# Patient Record
Sex: MALE | Race: BLACK OR AFRICAN AMERICAN | Employment: UNEMPLOYED | URBAN - METROPOLITAN AREA
[De-identification: names, ages, dates, MRNs, and addresses within clinical notes are randomized per-mention and may not be internally consistent; named-entity substitution may affect disease eponyms.]

---

## 2019-08-21 ENCOUNTER — HOSPITAL ENCOUNTER (EMERGENCY)
Age: 59
Discharge: HOME | End: 2019-08-21
Payer: MEDICARE

## 2019-08-21 VITALS
HEART RATE: 70 BPM | DIASTOLIC BLOOD PRESSURE: 71 MMHG | SYSTOLIC BLOOD PRESSURE: 111 MMHG | WEIGHT: 169.31 LBS | OXYGEN SATURATION: 96 % | TEMPERATURE: 99 F | RESPIRATION RATE: 14 BRPM | BODY MASS INDEX: 25 KG/M2

## 2019-08-21 DIAGNOSIS — L98.9 SKIN LESION: Primary | ICD-10-CM

## 2019-08-21 PROCEDURE — 99282 EMERGENCY DEPT VISIT SF MDM: CPT

## 2019-08-21 RX ORDER — BACITRACIN, NEOMYCIN, POLYMYXIN B 400; 3.5; 5 [USP'U]/G; MG/G; [USP'U]/G
OINTMENT TOPICAL 2 TIMES DAILY
Qty: 15 G | Refills: 0 | Status: SHIPPED | OUTPATIENT
Start: 2019-08-21

## 2019-08-21 ASSESSMENT — PAIN SCALES - GENERAL
PAINLEVEL_OUTOF10: 6
PAINLEVEL_OUTOF10: 6

## 2019-08-21 ASSESSMENT — PAIN DESCRIPTION - PAIN TYPE: TYPE: ACUTE PAIN

## 2019-08-21 ASSESSMENT — PAIN DESCRIPTION - DESCRIPTORS: DESCRIPTORS: OTHER (COMMENT)

## 2019-08-21 ASSESSMENT — PAIN - FUNCTIONAL ASSESSMENT: PAIN_FUNCTIONAL_ASSESSMENT: 0-10

## 2019-08-21 ASSESSMENT — PAIN DESCRIPTION - ORIENTATION: ORIENTATION: RIGHT

## 2019-08-21 ASSESSMENT — PAIN DESCRIPTION - LOCATION: LOCATION: ABDOMEN

## 2019-08-21 NOTE — ED PROVIDER NOTES
[address] [address] [address] [facility] [address]  Dept: [phone]  Loc: [address] ENCOUNTER        This patient was not seen or evaluated by the attending physician. I evaluated this patient, the attending physician was available for consultation. CHIEF COMPLAINT    Chief Complaint   Patient presents with    Other       HPI    Maggy Harris is a 59 y.o. male who presents with a skin lesion. The location is the abdomen. Patient states that he had a rash that he thought was psoriasis on his abdominal wall also he put steroid cream on it. He states that it then crusted over and the scab peeled off. He states there is now raw skin under the scab but he wants to make sure is not infected.     REVIEW OF SYSTEMS    Pulmonary: No difficulty breathing or chest tightness  Skin: see HPI  General: No fevers    PAST MEDICAL & SURGICAL HISTORY    Past Medical History:   Diagnosis Date    Back pain     Depression     History of alcohol dependence ([facility] [address]     Non-healing skin ulcers due to topical corticosteroid in therapeutic use (HCC)     Rash      Past Surgical History:   Procedure Laterality Date    COLONOSCOPY      HERNIA REPAIR         CURRENT MEDICATIONS  (may include discharge medications prescribed in the ED)      ALLERGIES    No Known Allergies    SOCIAL & FAMILY HISTORY    Social History     Socioeconomic History    Marital status: Single     Spouse name: None    Number of children: None    Years of education: None    Highest education level: None   Occupational History    None   Social Needs    Financial resource strain: None    Food insecurity:     Worry: None     Inability: None    Transportation needs:     Medical: None     Non-medical: None   Tobacco Use    Smoking status: Former Smoker     Packs/day: 0.50     Years: 15.00     Pack years: 7.50     Types: Cigarettes    Smokeless tobacco: Never Used  Tobacco comment: stopped smoking 01/2018   Substance and Sexual Activity    Alcohol use: No     Comment: drank 12 pack and 1/5 liquour daily for 15 years. SOBER x 8 years as of 11/25/13    Drug use: No    Sexual activity: Yes     Partners: Female   Lifestyle    Physical activity:     Days per week: None     Minutes per session: None    Stress: None   Relationships    Social connections:     Talks on phone: None     Gets together: None     Attends Yazdanism service: None     Active member of club or organization: None     Attends meetings of clubs or organizations: None     Relationship status: None    Intimate partner violence:     Fear of current or ex partner: None     Emotionally abused: None     Physically abused: None     Forced sexual activity: None   Other Topics Concern    None   Social History Narrative    None     Family History   Problem Relation Age of Onset    Diabetes Mother     Cancer Mother         breast    Diabetes Sister     Diabetes Brother        PHYSICAL EXAM    VITAL SIGNS: /71   Pulse 70   Temp 99 °F (37.2 °C) (Oral)   Resp 14   Wt 169 lb 5 oz (76.8 kg)   SpO2 96%   BMI 25.00 kg/m²   Constitutional:  Well developed, well nourished, no acute distress  HENT:  Atraumatic, no facial or lip swelling  ORAL EXAM:  No tongue swelling  Respiratory:  No respiratory distress  Musculoskeletal:  No edema, no acute deformities. Integument: + 1 x 1 cm area of desquamation on the right upper abdominal wall, no evidence of cellulitis, please see image below. There are no petechiae, pustules, purpura, induration, desquamation, bullae or discharge. ED COURSE & MEDICAL DECISION MAKING    Pertinent Labs reviewed and interpreted. (See chart for details)  See chart for details of medications given during the ED stay.     Differential diagnosis: Vasculitis, bacterial skin infection, viral rash, systemic infectious rash, Anaphylaxis, Urticaria, other    Patient is afebrile and

## 2019-08-26 ENCOUNTER — HOSPITAL ENCOUNTER (EMERGENCY)
Age: 59
Discharge: HOME | End: 2019-08-26
Attending: EMERGENCY MEDICINE
Payer: MEDICARE

## 2019-08-26 VITALS
BODY MASS INDEX: 23.89 KG/M2 | DIASTOLIC BLOOD PRESSURE: 68 MMHG | TEMPERATURE: 98.5 F | SYSTOLIC BLOOD PRESSURE: 109 MMHG | WEIGHT: 170.64 LBS | HEART RATE: 64 BPM | RESPIRATION RATE: 17 BRPM | OXYGEN SATURATION: 98 % | HEIGHT: 71 IN

## 2019-08-26 DIAGNOSIS — L30.9 DERMATITIS: ICD-10-CM

## 2019-08-26 DIAGNOSIS — T78.40XA ALLERGIC REACTION, INITIAL ENCOUNTER: Primary | ICD-10-CM

## 2019-08-26 PROCEDURE — 99282 EMERGENCY DEPT VISIT SF MDM: CPT

## 2019-08-26 PROCEDURE — 6370000000 HC RX 637 (ALT 250 FOR IP): Performed by: EMERGENCY MEDICINE

## 2019-08-26 RX ORDER — METHYLPREDNISOLONE 4 MG/1
TABLET ORAL
Qty: 1 KIT | Refills: 0 | Status: SHIPPED | OUTPATIENT
Start: 2019-08-26 | End: 2022-09-14

## 2019-08-26 RX ORDER — CLINDAMYCIN HYDROCHLORIDE 150 MG/1
150 CAPSULE ORAL 4 TIMES DAILY
Qty: 40 CAPSULE | Refills: 0 | Status: SHIPPED | OUTPATIENT
Start: 2019-08-26 | End: 2019-09-05

## 2019-08-26 RX ORDER — PREDNISONE 20 MG/1
60 TABLET ORAL ONCE
Status: COMPLETED | OUTPATIENT
Start: 2019-08-26 | End: 2019-08-26

## 2019-08-26 RX ORDER — HYDROCODONE BITARTRATE AND ACETAMINOPHEN 7.5; 3 MG/1; MG/1
7.5 TABLET ORAL 3 TIMES DAILY PRN
COMMUNITY

## 2019-08-26 RX ADMIN — PREDNISONE 60 MG: 20 TABLET ORAL at 13:32

## 2019-08-26 ASSESSMENT — PAIN DESCRIPTION - LOCATION: LOCATION: ABDOMEN

## 2019-08-26 ASSESSMENT — PAIN SCALES - GENERAL: PAINLEVEL_OUTOF10: 4

## 2019-08-26 ASSESSMENT — PAIN DESCRIPTION - DESCRIPTORS: DESCRIPTORS: BURNING

## 2019-08-26 ASSESSMENT — PAIN DESCRIPTION - PAIN TYPE: TYPE: ACUTE PAIN

## 2019-08-26 NOTE — CARE COORDINATION
SW met with patient and asked about a PCP. Patient informed that he had a PCP, Dominique Gamboa at [facility].

## 2019-08-29 NOTE — ED NOTES
[id number] already gently cleaned area with hibiclens and saline. Now reviewed home wound care. 8-10 4x4 gauzes placed to abd wound and taped in place. Explained rx's. Explained to pt that she could use sanitary napkins for absorbent bandages (showed him how to do this)     Sent home two boxes of 4x4 gauze, rolls of tape, and several sanitary napkins for home use. Pt has been buying large quantities of dressing change supplies at home due to amt of drainage he had at home. Encouraged to start antibiotics ASAP so it can start working right away. Pain to rash area at 4.         Gladis Holliday RN  08/29/19 7890

## 2022-09-14 ENCOUNTER — HOSPITAL ENCOUNTER (EMERGENCY)
Age: 62
Discharge: HOME | End: 2022-09-14
Attending: EMERGENCY MEDICINE
Payer: MEDICARE

## 2022-09-14 ENCOUNTER — APPOINTMENT (OUTPATIENT)
Dept: GENERAL RADIOLOGY | Age: 62
End: 2022-09-14
Payer: MEDICARE

## 2022-09-14 VITALS
HEIGHT: 71 IN | TEMPERATURE: 98.5 F | DIASTOLIC BLOOD PRESSURE: 63 MMHG | WEIGHT: 162.92 LBS | RESPIRATION RATE: 27 BRPM | BODY MASS INDEX: 22.81 KG/M2 | OXYGEN SATURATION: 98 % | HEART RATE: 51 BPM | SYSTOLIC BLOOD PRESSURE: 117 MMHG

## 2022-09-14 DIAGNOSIS — R07.9 CHEST PAIN, UNSPECIFIED TYPE: Primary | ICD-10-CM

## 2022-09-14 DIAGNOSIS — G44.209 TENSION HEADACHE: ICD-10-CM

## 2022-09-14 LAB
ANION GAP SERPL CALCULATED.3IONS-SCNC: 11 MMOL/L (ref 3–16)
BASOPHILS ABSOLUTE: 0 K/UL (ref 0–0.2)
BASOPHILS RELATIVE PERCENT: 0.4 %
BUN BLDV-MCNC: 10 MG/DL (ref 7–20)
CALCIUM SERPL-MCNC: 9.4 MG/DL (ref 8.3–10.6)
CHLORIDE BLD-SCNC: 105 MMOL/L (ref 99–110)
CO2: 26 MMOL/L (ref 21–32)
CREAT SERPL-MCNC: 0.9 MG/DL (ref 0.8–1.3)
EOSINOPHILS ABSOLUTE: 0.1 K/UL (ref 0–0.6)
EOSINOPHILS RELATIVE PERCENT: 2.4 %
GFR AFRICAN AMERICAN: >60
GFR NON-AFRICAN AMERICAN: >60
GLUCOSE BLD-MCNC: 87 MG/DL (ref 70–99)
HCT VFR BLD CALC: 39.9 % (ref 40.5–52.5)
HEMOGLOBIN: 13.9 G/DL (ref 13.5–17.5)
LYMPHOCYTES ABSOLUTE: 2.5 K/UL (ref 1–5.1)
LYMPHOCYTES RELATIVE PERCENT: 45.7 %
MCH RBC QN AUTO: 32.5 PG (ref 26–34)
MCHC RBC AUTO-ENTMCNC: 34.8 G/DL (ref 31–36)
MCV RBC AUTO: 93.4 FL (ref 80–100)
MONOCYTES ABSOLUTE: 0.6 K/UL (ref 0–1.3)
MONOCYTES RELATIVE PERCENT: 10.5 %
NEUTROPHILS ABSOLUTE: 2.3 K/UL (ref 1.7–7.7)
NEUTROPHILS RELATIVE PERCENT: 41 %
PDW BLD-RTO: 14.3 % (ref 12.4–15.4)
PLATELET # BLD: 166 K/UL (ref 135–450)
PMV BLD AUTO: 8.7 FL (ref 5–10.5)
POTASSIUM REFLEX MAGNESIUM: 4 MMOL/L (ref 3.5–5.1)
RBC # BLD: 4.28 M/UL (ref 4.2–5.9)
SODIUM BLD-SCNC: 142 MMOL/L (ref 136–145)
TROPONIN: <0.01 NG/ML
WBC # BLD: 5.6 K/UL (ref 4–11)

## 2022-09-14 PROCEDURE — 36415 COLL VENOUS BLD VENIPUNCTURE: CPT

## 2022-09-14 PROCEDURE — 99285 EMERGENCY DEPT VISIT HI MDM: CPT

## 2022-09-14 PROCEDURE — 6370000000 HC RX 637 (ALT 250 FOR IP): Performed by: EMERGENCY MEDICINE

## 2022-09-14 PROCEDURE — 80048 BASIC METABOLIC PNL TOTAL CA: CPT

## 2022-09-14 PROCEDURE — 85025 COMPLETE CBC W/AUTO DIFF WBC: CPT

## 2022-09-14 PROCEDURE — 93005 ELECTROCARDIOGRAM TRACING: CPT | Performed by: EMERGENCY MEDICINE

## 2022-09-14 PROCEDURE — 71045 X-RAY EXAM CHEST 1 VIEW: CPT

## 2022-09-14 PROCEDURE — 84484 ASSAY OF TROPONIN QUANT: CPT

## 2022-09-14 RX ORDER — ACETAMINOPHEN 500 MG
1000 TABLET ORAL ONCE
Status: COMPLETED | OUTPATIENT
Start: 2022-09-14 | End: 2022-09-14

## 2022-09-14 RX ADMIN — ACETAMINOPHEN 1000 MG: 500 TABLET ORAL at 16:33

## 2022-09-14 ASSESSMENT — PAIN - FUNCTIONAL ASSESSMENT: PAIN_FUNCTIONAL_ASSESSMENT: 0-10

## 2022-09-14 ASSESSMENT — ENCOUNTER SYMPTOMS
CHEST TIGHTNESS: 0
RHINORRHEA: 0
COUGH: 0
SHORTNESS OF BREATH: 0
NAUSEA: 0
WHEEZING: 0
DIARRHEA: 0
SORE THROAT: 0
ABDOMINAL PAIN: 0
EYES NEGATIVE: 1
COLOR CHANGE: 0
VOMITING: 0

## 2022-09-14 ASSESSMENT — PAIN SCALES - GENERAL: PAINLEVEL_OUTOF10: 5

## 2022-09-14 ASSESSMENT — PAIN DESCRIPTION - LOCATION: LOCATION: CHEST

## 2022-09-14 ASSESSMENT — HEART SCORE: ECG: 0

## 2022-09-14 ASSESSMENT — PAIN DESCRIPTION - DESCRIPTORS: DESCRIPTORS: ACHING

## 2022-09-14 NOTE — ED TRIAGE NOTES
Patient presents to ED complaining of intermittent chest pain x2 weeks states it has been getting more frequent. Reports suspecting mold issues at his residence, Denies SOB, denies cardiac or respiratory hx. Patient also reports headaches which started ed around 1 week ago. MD at bedside. Patient resting on bed, respirations even and easy at this time. No obvious distress.

## 2022-09-14 NOTE — ED PROVIDER NOTES
[address]      Pt Name: Leo Escobar  MRN: [id number]  [place] 1960  Date ofevaluation: 9/14/2022  Provider: Amadeo Monzon MD    CHIEF COMPLAINT       Chief Complaint   Patient presents with    Chest Pain     Patient reports intermittent chest pain x2 weeks states it has been getting more frequent. Reports suspecting mold issues at his residence, Denies SOB, denies cardiac or respiratory hx. Patient also reports headaches which started ed around 1 week ago. Headache         HISTORY OF PRESENT ILLNESS   (Location/Symptom, Timing/Onset,Context/Setting, Quality, Duration, Modifying Factors, Severity)  Note limiting factors. Leo Escobar is a 59 y.o. male  who  has a past medical history of Back pain, Depression, History of alcohol dependence ([address] Non-healing skin ulcers due to topical corticosteroid in therapeutic use ([facility] [address] and Rash.    who presents to the emergency department    15-year-old male presents with intermittent generalized chest pain which has been fluctuating intensity for the last 2 weeks. Comes on randomly usually comes on at rest.  Is never worse with exertion. Nothing specifically seems to make it better or worse. Patient denies any shortness of breath fevers chills nausea vomiting diarrhea. Patient reports that he is concerned that his chest pain is being caused by potential mold issues in his apartment. He states that he recently had part of his ceiling fall out and he believes that there is mold in his walls. He believes this is also been giving him a mild intermittent generalized achy headache. No numbness weakness vision changes neck pain. Patient has past medical history of no chronic cardiac abnormalities. No other known risk factors    The history is provided by the patient. No  was used. NursingNotes were reviewed.     REVIEW OF SYSTEMS    (2-9 systems for level 4, 10 or more for level 5)     Review of Systems   Constitutional: Negative. Negative for fatigue and fever. HENT:  Negative for congestion, rhinorrhea and sore throat. Eyes: Negative. Respiratory:  Negative for cough, chest tightness, shortness of breath and wheezing. Cardiovascular:  Positive for chest pain. Gastrointestinal:  Negative for abdominal pain, diarrhea, nausea and vomiting. Genitourinary: Negative. Musculoskeletal: Negative. Skin:  Negative for color change and rash. Allergic/Immunologic: Negative for environmental allergies and immunocompromised state. Neurological:  Positive for headaches. Negative for dizziness and light-headedness. Hematological: Negative. All other systems reviewed and are negative. Except as noted above the remainder of the review of systems was reviewed and negative. PAST MEDICAL HISTORY     Past Medical History:   Diagnosis Date    Back pain     Depression     History of alcohol dependence ([facility] [address]     Non-healing skin ulcers due to topical corticosteroid in therapeutic use ([facility] [address]     Rash          SURGICALHISTORY       Past Surgical History:   Procedure Laterality Date    COLONOSCOPY      CYST REMOVAL      upper back 2022    HERNIA REPAIR           CURRENT MEDICATIONS       Previous Medications    HYDROCODONE-ACETAMINOPHEN 7.5-300 MG TABS    Take 7.5 mg of opioid by mouth 3 times daily as needed for Pain. NEOMYCIN-BACITRACIN-POLYMYXIN (NEOSPORIN) 400-5-5000 OINTMENT    Apply topically 2 times daily            Patient has no known allergies.     FAMILY HISTORY       Family History   Problem Relation Age of Onset    Diabetes Mother     Cancer Mother         breast    Diabetes Sister     Diabetes Brother           SOCIAL HISTORY       Social History     Socioeconomic History    Marital status: Single     Spouse name: None    Number of children: None    Years of education: None    Highest education level: None   Tobacco Use    Smoking status: Former Packs/day: 0.50     Years: 15.00     Pack years: 7.50     Types: Cigarettes    Smokeless tobacco: Never    Tobacco comments:     stopped smoking 01/2018   Substance and Sexual Activity    Alcohol use: No     Comment: drank 12 pack and 1/5 liquour daily for 15 years. SOBER x 8 years as of 11/25/13    Drug use: No    Sexual activity: Yes     Partners: Female       SCREENINGS    [place] Coma Scale  Eye Opening: Spontaneous  Best Verbal Response: Oriented  Best Motor Response: Obeys commands  [place] Coma Scale Score: 15 Heart Score for chest pain patients  History: Slightly Suspicious  ECG: Normal  Patient Age: > 45 and < 65 years  Risk Factors: No risk factors known  Troponin: < 1X normal limit  Heart Score Total: 1      PHYSICAL EXAM    (up to 7 for level 4, 8 or more for level 5)     ED Triage Vitals [09/14/22 1606]   BP Temp Temp Source Heart Rate Resp SpO2 Height Weight   117/72 98.5 °F (36.9 °C) Oral 61 16 98 % 5' 11\" (1.803 m) 162 lb 14.7 oz (73.9 kg)       Physical Exam  Vitals and nursing note reviewed. Constitutional:       General: He is not in acute distress. Appearance: Normal appearance. He is well-developed and normal weight. He is not ill-appearing, toxic-appearing or diaphoretic. HENT:      Head: Normocephalic and atraumatic. Mouth/Throat:      Mouth: Mucous membranes are moist.      Pharynx: Oropharynx is clear. Eyes:      Extraocular Movements: Extraocular movements intact. Cardiovascular:      Rate and Rhythm: Normal rate and regular rhythm. Pulses: Normal pulses. Heart sounds: Normal heart sounds. No murmur heard. No gallop. Pulmonary:      Effort: Pulmonary effort is normal. No respiratory distress. Breath sounds: Normal breath sounds. No decreased breath sounds, wheezing, rhonchi or rales. Chest:      Chest wall: No tenderness. Abdominal:      General: Bowel sounds are normal.      Palpations: Abdomen is soft. Tenderness:  There is no abdominal tenderness. Musculoskeletal:         General: Normal range of motion. Cervical back: Normal range of motion and neck supple. Right lower leg: No edema. Left lower leg: No edema. Skin:     General: Skin is warm and dry. Capillary Refill: Capillary refill takes less than 2 seconds. Findings: No rash. Neurological:      General: No focal deficit present. Mental Status: He is alert and oriented to person, place, and time. GCS: GCS eye subscore is 4. GCS verbal subscore is 5. GCS motor subscore is 6. Cranial Nerves: Cranial nerves are intact. No cranial nerve deficit, dysarthria or facial asymmetry. Sensory: Sensation is intact. No sensory deficit. Motor: Motor function is intact. No weakness, tremor, atrophy, abnormal muscle tone, seizure activity or pronator drift. Coordination: Coordination is intact. Coordination normal. Finger-Nose-Finger Test and Heel to [place] tiffanie Test normal.   Psychiatric:         Mood and Affect: Mood normal.         Behavior: Behavior normal.       RESULTS     EKG: All EKG's are interpreted by the Emergency Department Physician who either signs or Co-signsthis chart in the absence of a cardiologist.    EKG Interpretation    Interpreted by emergency department physician    Rhythm: Sinus bradycardia  Rate: 54  Axis: normal  Ectopy: none  Conduction: normal  ST Segments: normal  T Waves: normal  Q Waves: none    Clinical Impression: Sinus bradycardia with signs of left ventricular hypertrophy without signs of acute ischemia       RADIOLOGY:   Non-plain filmimages such as CT, Ultrasound and MRI are read by the radiologist.   Interpretation per the Radiologist below, if available at the time ofthis note:    XR CHEST PORTABLE   Preliminary Result   No acute cardiopulmonary process.                ED BEDSIDE ULTRASOUND:   Performed by ED Physician - none    LABS:  Labs Reviewed   CBC WITH AUTO DIFFERENTIAL - Abnormal; Notable for the following components:       Result Value    Hematocrit 39.9 (*)     All other components within normal limits   BASIC METABOLIC PANEL W/ REFLEX TO MG FOR LOW K   TROPONIN       All other labs were within normal range or not returned as of this dictation. EMERGENCY DEPARTMENT COURSE and DIFFERENTIAL DIAGNOSIS/MDM:   Vitals:    Vitals:    09/14/22 1606   BP: 117/72   Pulse: 61   Resp: 16   Temp: 98.5 °F (36.9 °C)   TempSrc: Oral   SpO2: 98%   Weight: 162 lb 14.7 oz (73.9 kg)   Height: 5' 11\" (1.803 m)       Patient was given thefollowing medications:  Medications   acetaminophen (TYLENOL) tablet 1,000 mg (1,000 mg Oral Given 9/14/22 1633)       ED COURSE & MEDICAL DECISION MAKING    Pertinent Labs & Imaging studies reviewed. (See chart for details)   -  Patient seen and evaluated in the emergency department. -  Triage and nursing notes reviewed and incorporated. -  Old chart records reviewed and incorporated. -  Differential diagnosis includes: Differential Diagnosis: Acute Coronary Syndrome, Congestive Heart Failure, Thoracic Dissection, Pericarditis, Pericardial Effusion, Pulmonary Embolism, Pneumonia, Pneumothorax, Ischemic Bowel, Bowel Obstruction, PUD, GERD, Acute Cholecystitis, Pancreatitis, Hepatitis, Colitis, tension headache, mold exposure, other    Well-appearing 67-year-old male presents for intermittent atypical chest pain for the last 2 weeks. No known cardiac risk factors. Troponin is negative. EKG without signs of ischemia. Exam is unremarkable lung sounds are clear. Afebrile not tachycardic saturating well on room air normotensive. Heart score is low at this time. No concern for fluid overload, congestive heart failure no risk factors for dissection. No reproducible abdominal tenderness. Patient has mild headache generalized without focal symptoms. Not the worst of his life gradual in onset not thunderclap no concern for subarachnoid hemorrhage or subdural hemorrhage.   Given for headache with significant improvement. Will discharge with strict return precautions for any new or worsening symptoms as well as instructions to follow-up with his primary care provider. I also spoke to him about his potential mold exposure. At this time his lung exam is clear his x-ray is clear he is saturating well on room air. He shows no signs of acute mold infection but I encouraged him to follow-up with his landlord for further testing on his apartment And to follow-up with his primary doctor. -  Work-up included:  See above  -  ED treatment included: See above  -  Results discussed with patient. The patient is agreeable with plan of care and disposition. Is this patient to be included in the SEP-1 Core Measure due to severe sepsis or septic shock? No   Exclusion criteria - the patient is NOT to be included for SEP-1 Core Measure due to: Infection is not suspected     REASSESSMENT      I estimate there is LOW risk for PULMONARY EMBOLISM, ACUTE CORONARY SYNDROME, OR THORACIC AORTIC DISSECTION, thus I consider the discharge disposition reasonable. I completed a HEART Score to screen for Major Adverse Cardiac Event (MACE) in this patient. The evidence indicates that  the patient is very low risk for MACE and this is consistent with my clinical intuition. The risk of further workup or hospitalization for MACE is likely higher than the risk of the patient having a MACE. It is,  therefore, in the patients best interest not to do additional emergent testing or to be hospitalized for MACE. I have discussed with the patient my clinical impression and the result of the HEART Score to screen for MACE, as well as the  risks of further testing and hospitalization. The HEART Score shows that the risk for MACE is less than 1%    The patient is at low risk for mortality based on demographic, history and clinical factors.  Given the best available information and clinical assessment, I estimate the risk of hospitalization to be greater than risk of treatment at home. I have explained to the patient that the risk could rapidly change, given precautions for return and instructions. Explained to patient that the risk for mortality is low based on demographic, history and clinical factors. I discussed with patient the results of evaluation in the ED, diagnosis, care, and prognosis. The plan is to discharge to home. Patient is in agreement with plan and questions have been answered. I also discussed with patient the reasons which may require a return visit and the importance of follow-up care. The patient is well-appearing, nontoxic, and improved at the time of discharge. Patient agrees to call to arrange follow-up care as directed. Patient understands to return immediately for worsening/change in symptoms. CRITICAL CARE TIME   Total Critical Care time was 18 minutes, excluding separately reportable procedures. There was a high probability of clinically significant/life threatening deterioration in the patient's condition which required my urgent intervention. This includes multiple reevaluations, vital sign monitoring, pulse oximetry monitoring, telemetry monitoring, clinical response to the IV medications, reviewing the nursing notes, consultation time, dictation/documentation time, and interpretation of the labwork. (This time excludes time spent performing procedures). CONSULTS:  None    PROCEDURES:  Unless otherwise noted below, none     Procedures    FINAL IMPRESSION      1. Chest pain, unspecified type    2.  Tension headache          DISPOSITION/PLAN   DISPOSITION Decision To Discharge 09/14/2022 05:06:40 PM      PATIENT REFERREDTO:  MD Miguel Singh [id number] [facility] [id number]  [phone]    Schedule an appointment as soon as possible for a visit       DISCHARGEMEDICATIONS:  New Prescriptions    No medications on file          (Please note that portions of this note were completed with a voice recognition program.  Efforts were made to edit the dictations but occasionally words are mis-transcribed.)    Mirta John MD (electronically signed)  Attending Emergency Physician         Mirta John MD  09/14/22 [id number]

## 2022-09-14 NOTE — ED NOTES
Patient ambulatory from ED. AVS provided and discussed with patient. All questions answered. Patient verbalizes understanding of discharge instructions. Respirations even and easy. No obvious distress at this time.        Marce Jackson RN  09/14/22 2668

## 2022-09-15 LAB
EKG ATRIAL RATE: 54 BPM
EKG DIAGNOSIS: NORMAL
EKG P AXIS: 74 DEGREES
EKG P-R INTERVAL: 138 MS
EKG Q-T INTERVAL: 426 MS
EKG QRS DURATION: 102 MS
EKG QTC CALCULATION (BAZETT): 403 MS
EKG R AXIS: 72 DEGREES
EKG T AXIS: 75 DEGREES
EKG VENTRICULAR RATE: 54 BPM

## 2022-09-15 PROCEDURE — 93010 ELECTROCARDIOGRAM REPORT: CPT | Performed by: INTERNAL MEDICINE

## 2023-03-10 ENCOUNTER — HOSPITAL ENCOUNTER (EMERGENCY)
Age: 63
Discharge: HOME | End: 2023-03-10
Payer: MEDICARE

## 2023-03-10 VITALS
OXYGEN SATURATION: 96 % | RESPIRATION RATE: 16 BRPM | HEART RATE: 80 BPM | DIASTOLIC BLOOD PRESSURE: 61 MMHG | SYSTOLIC BLOOD PRESSURE: 127 MMHG | TEMPERATURE: 97.6 F | HEIGHT: 71 IN | WEIGHT: 173.94 LBS | BODY MASS INDEX: 24.35 KG/M2

## 2023-03-10 DIAGNOSIS — G89.29 CHRONIC LOW BACK PAIN, UNSPECIFIED BACK PAIN LATERALITY, UNSPECIFIED WHETHER SCIATICA PRESENT: Primary | ICD-10-CM

## 2023-03-10 DIAGNOSIS — M54.50 CHRONIC LOW BACK PAIN, UNSPECIFIED BACK PAIN LATERALITY, UNSPECIFIED WHETHER SCIATICA PRESENT: Primary | ICD-10-CM

## 2023-03-10 PROCEDURE — 99283 EMERGENCY DEPT VISIT LOW MDM: CPT

## 2023-03-10 PROCEDURE — 6370000000 HC RX 637 (ALT 250 FOR IP): Performed by: PHYSICIAN ASSISTANT

## 2023-03-10 RX ORDER — HYDROCODONE BITARTRATE AND ACETAMINOPHEN 5; 325 MG/1; MG/1
1.5 TABLET ORAL ONCE
Status: COMPLETED | OUTPATIENT
Start: 2023-03-10 | End: 2023-03-10

## 2023-03-10 RX ADMIN — HYDROCODONE BITARTRATE AND ACETAMINOPHEN 1.5 TABLET: 5; 325 TABLET ORAL at 17:48

## 2023-03-10 ASSESSMENT — PAIN SCALES - GENERAL: PAINLEVEL_OUTOF10: 9

## 2023-03-10 ASSESSMENT — ENCOUNTER SYMPTOMS
SHORTNESS OF BREATH: 0
CONSTIPATION: 0
ABDOMINAL PAIN: 0
SORE THROAT: 0
COUGH: 0
BACK PAIN: 1
NAUSEA: 0
VOMITING: 0
DIARRHEA: 0

## 2023-03-10 NOTE — ED PROVIDER NOTES
[address] [employer]        Pt Name: Cecilia Neff  MRN: [id number]  [place] 1960  Date of evaluation: 3/10/2023  Provider: ALANA Somers  PCP: Gloria Dominguez MD  Note Started: 5:02 PM EST 3/10/23      NIKHIL. I have evaluated this patient. My supervising physician was available for consultation. CHIEF COMPLAINT       Chief Complaint   Patient presents with    Back Pain     Chronic back pain x5 years, pt states he is in between pain doctors right now. Pt states he is supposed to see a UC doctor next week. Pt normally takes Norco for pain and has been out of it since 2/23. HISTORY OF PRESENT ILLNESS: 1 or more Elements     History From: Patient   Limitations to history : None    Cecilia Neff is a 57 y.o. male who presents to the emergency department today with complaints of back pain. This pain started > 5 years ago after an injury. The pain is all over his back but mainly on the left lower back and can radiate down both legs. The patient states standing, sitting, lifting, fast movements make the pain worse but heat helps. The pain is constant in nature and has sharp tendencies but remains constant. The patient has tried Tylenol, ibuprofen, muscle relaxer's with little to no relief. Pain is a 9/10. Patient had previously been enrolled with pain management, however the pain management physician moved to a location that was too far for him to get to from his home, so he is in the process of changing to a new pain management physician. He did have an appointment with them last month, and has a follow-up appoint with them on Tuesday. He states he has been out of his Norco now for about 3 weeks. He has been trying to manage without it, but states that recently the pain has become more severe, prompting his visit today. He denies any bowel or bladder incontinence, numbness, tingling, or weakness in his extremities.   He denies any recent reinjury or fall. He has no further complaints    Nursing Notes were all reviewed and agreed with or any disagreements were addressed in the HPI. REVIEW OF SYSTEMS :      Review of Systems   Constitutional:  Negative for chills and fever. HENT:  Negative for congestion and sore throat. Respiratory:  Negative for cough and shortness of breath. Cardiovascular:  Negative for chest pain and palpitations. Gastrointestinal:  Negative for abdominal pain, constipation, diarrhea, nausea and vomiting. Musculoskeletal:  Positive for back pain. Back pain mainly to the lower left, but is generally all over. Pain radiates down to the feet. Neurological:  Negative for headaches. Psychiatric/Behavioral:  Negative for agitation and confusion. Positives and Pertinent negatives as per HPI. SURGICAL HISTORY     Past Surgical History:   Procedure Laterality Date    COLONOSCOPY      CYST REMOVAL      upper back 2022    [facility]       Discharge Medication List as of 3/10/2023  5:51 PM        CONTINUE these medications which have NOT CHANGED    Details   HYDROcodone-acetaminophen 7.5-300 MG TABS Take 7.5 mg of opioid by mouth 3 times daily as needed for Pain. Historical Med      neomycin-bacitracin-polymyxin (NEOSPORIN) 400-5-5000 ointment Apply topically 2 times daily, Topical, 2 TIMES DAILY Starting Wed 8/21/2019, Disp-15 g, R-0, Print             ALLERGIES     Patient has no known allergies.     FAMILYHISTORY       Family History   Problem Relation Age of Onset    Diabetes Mother     Cancer Mother         breast    Diabetes Sister     Diabetes Brother         SOCIAL HISTORY       Social History     Tobacco Use    Smoking status: Former     Packs/day: 0.50     Years: 15.00     Pack years: 7.50     Types: Cigarettes    Smokeless tobacco: Never    Tobacco comments:     stopped smoking 01/2018   Substance Use Topics    Alcohol use: No     Comment: drank 12 pack and 1/5 liquour daily for 15 years. SOBER now 3/10/23    Drug use: No       SCREENINGS        [place] Coma Scale  Eye Opening: Spontaneous  Best Verbal Response: Oriented  Best Motor Response: Obeys commands  [place] Coma Scale Score: 15                CIWA Assessment  BP: 127/61  Heart Rate: 80           PHYSICAL EXAM  1 or more Elements     ED Triage Vitals [03/10/23 1621]   BP Temp Temp Source Heart Rate Resp SpO2 Height Weight   127/61 97.6 °F (36.4 °C) Oral 80 16 96 % 5' 11\" (1.803 m) 173 lb 15.1 oz (78.9 kg)     Physical Exam  Constitutional:       General: He is not in acute distress. Appearance: Normal appearance. He is not ill-appearing. HENT:      Head: Normocephalic and atraumatic. Right Ear: Tympanic membrane normal.      Left Ear: Tympanic membrane normal.      Mouth/Throat:      Mouth: Mucous membranes are moist.      Pharynx: Oropharynx is clear. Cardiovascular:      Rate and Rhythm: Normal rate and regular rhythm. Pulmonary:      Effort: Pulmonary effort is normal.      Breath sounds: Normal breath sounds. No rales. Chest:      Chest wall: No tenderness. Abdominal:      General: Bowel sounds are normal. There is no distension. Palpations: Abdomen is soft. Tenderness: There is no abdominal tenderness. Musculoskeletal:      Cervical back: Normal, normal range of motion and neck supple. Thoracic back: Normal.      Lumbar back: Tenderness present. No deformity or bony tenderness. Skin:     General: Skin is warm and dry. Findings: No rash. Neurological:      General: No focal deficit present. Mental Status: He is alert and oriented to person, place, and time. GCS: GCS eye subscore is 4. GCS verbal subscore is 5. GCS motor subscore is 6. Cranial Nerves: Cranial nerves 2-12 are intact. Sensory: Sensation is intact. No sensory deficit.       Deep Tendon Reflexes:      Reflex Scores:       Patellar reflexes are 2+ on the right side and 2+ on the left side.       Achilles reflexes are 2+ on the right side and 2+ on the left side. Psychiatric:         Mood and Affect: Mood normal.         Behavior: Behavior normal.           DIAGNOSTIC RESULTS   LABS:    Labs Reviewed - No data to display    When ordered only abnormal lab results are displayed. All other labs were within normal range or not returned as of this dictation. EKG: When ordered, EKG's are interpreted by the Emergency Department Physician in the absence of a cardiologist.  Please see their note for interpretation of EKG. RADIOLOGY:   Non-plain film images such as CT, Ultrasound and MRI are read by the radiologist. Plain radiographic images are visualized and preliminarily interpreted by the ED Provider with the below findings:    Interpretation per the Radiologist below, if available at the time of this note:    No orders to display     No results found. No results found. PROCEDURES   Unless otherwise noted below, none     Procedures    PAST MEDICAL HISTORY      has a past medical history of Back pain, Depression, History of alcohol dependence ([facility] [address] Non-healing skin ulcers due to topical corticosteroid in therapeutic use ([facility] [address] and Rash. EMERGENCY DEPARTMENT COURSE and DIFFERENTIAL DIAGNOSIS/MDM:   Vitals:    Vitals:    03/10/23 1621   BP: 127/61   Pulse: 80   Resp: 16   Temp: 97.6 °F (36.4 °C)   TempSrc: Oral   SpO2: 96%   Weight: 173 lb 15.1 oz (78.9 kg)   Height: 5' 11\" (1.803 m)       Patient was given the following medications:  Medications   HYDROcodone-acetaminophen (NORCO) 5-325 MG per tablet 1.5 tablet (1.5 tablets Oral Given 3/10/23 6944)             Is this patient to be included in the SEP-1 Core Measure due to severe sepsis or septic shock?    No   Exclusion criteria - the patient is NOT to be included for SEP-1 Core Measure due to:  2+ SIRS criteria are not met    Chronic Conditions affecting care:   has a past medical history of Back pain, Depression, History of alcohol dependence ([facility] [address] Non-healing skin ulcers due to topical corticosteroid in therapeutic use ([facility] [address] and Rash. CONSULTS: (Who and What was discussed)  None    Social Determinants Significantly Affecting Health : None    Records Reviewed (External and Source) I have personally reviewed  the patient's medical records, reviewed most recent pain management visit, patient had a negative urine drug screen at his last appointment, and they will plan to further discuss treatment for his chronic back pain with narcotics at his upcoming visit. CC/HPI Summary, DDx, ED Course, and Reassessment:   The patient is a pleasant 64-year-old male presenting to the emergency department today for complaints of chronic back pain. The patient states that he had an injury 5 years ago which resulted in herniated disc and has had pain ever since. The patient says he has been going to pain management for the last 5 years but his doctor is moving and is in between doctors. The patient's next appointment is next week. The pain is generalized to all over the back but especially the lower left side. The pain is constant in nature but can have sharp tendencies. The pain can also radiate down to both feet. Ibuprofen, Tylenol, muscle relaxers but nothing has helped. The patient states standing for a long time, sitting, lifting, fast movement makes the pain worse but heat and the medication he got from his doctor helps. The patient denies any nausea, vomiting, fever, chills. Has gone to physical therapy before and has tried stimulation and that is helped but the patient states he has not been going as he is in between doctors. - Vital signs were stable. Exam revealed an alert and oriented male. Normal heart sounds, rhythm. Clear lungs bilaterally. Tenderness upon palpitation to the generalized back, especially the lower left back. No ecchymosis, erythema, or edema. No leg swelling or tenderness bilaterally.    - Pertinent Labs & Imaging studies reviewed. (See chart for details)   -  Patient seen and evaluated in the emergency department. -  Triage and nursing notes reviewed and incorporated. -  Old chart records reviewed and incorporated. -   I have seen and evaluated this patient with my supervising physician No att. providers found. -  Differential diagnosis includes: Acute exacerbation of chronic back pain, herniated disc, arthritis, muscle spasm.  -  Work-up included:  See above  -  ED treatment included:  Norco 5/325mg PO   -  Results discussed with patient. Patient feels better upon revaluation . At this time, we recommend discharge, as the patient is better and still hemodynamically stable. We discussed that it is inappropriate for me to prescribe narcotic pain medication from the emergency room for his chronic pain. He will need to follow-up with his pain management provider to discuss further prescriptions. He was very understanding of the limitations in the emergency department, and does plan to follow-up with his primary care provider and/or pain management provider for further evaluation and management on an outpatient basis. The patient is agreeable with plan of care and disposition.  -  Disposition:  Home in stable condition  - Critical Care: 0 minutes    FINAL IMPRESSION      1.  Chronic low back pain, unspecified back pain laterality, unspecified whether sciatica present          DISPOSITION/PLAN     DISPOSITION Decision To Discharge 03/10/2023 05:53:30 PM      PATIENT REFERRED TO:  Obed James MD  [address] [place] [address]  [phone]    Schedule an appointment as soon as possible for a visit       MD Miguel Fowler [address] [facility] [address] [phone]    Schedule an appointment as soon as possible for a visit       [place] [id number] [phone]    If symptoms worsen    DISCHARGE MEDICATIONS:  Discharge Medication List as of 3/10/2023  5:51 PM          DISCONTINUED MEDICATIONS:  Discharge Medication List as of 3/10/2023  5:51 PM                 (Please note that portions of this note were completed with a voice recognition program.  Efforts were made to edit the dictations but occasionally words are mis-transcribed.)    ALANA Christiansen (electronically signed)       [place], [address]  03/10/23 2002

## 2023-03-10 NOTE — ED NOTES
Pt arrives ambulatory to the ED with complaints of back pain x5 years. Pt states he recently changed pain doctors and he does not see the one at  until next week. Pt usually takes norco for pain but has been out of it since 2/23. Pt states he has been taking muscle relaxer's with no relief.       Norman Kam RN  03/10/23 9933

## 2023-08-25 ENCOUNTER — HOSPITAL ENCOUNTER (EMERGENCY)
Age: 63
Discharge: HOME | End: 2023-08-25
Payer: MEDICARE

## 2023-08-25 ENCOUNTER — APPOINTMENT (OUTPATIENT)
Dept: GENERAL RADIOLOGY | Age: 63
End: 2023-08-25
Payer: MEDICARE

## 2023-08-25 VITALS
DIASTOLIC BLOOD PRESSURE: 45 MMHG | HEIGHT: 71 IN | BODY MASS INDEX: 21.67 KG/M2 | RESPIRATION RATE: 14 BRPM | TEMPERATURE: 98.2 F | WEIGHT: 154.76 LBS | OXYGEN SATURATION: 99 % | HEART RATE: 52 BPM | SYSTOLIC BLOOD PRESSURE: 108 MMHG

## 2023-08-25 DIAGNOSIS — M19.012 ARTHRITIS OF LEFT SHOULDER REGION: ICD-10-CM

## 2023-08-25 DIAGNOSIS — M25.512 ACUTE PAIN OF LEFT SHOULDER: Primary | ICD-10-CM

## 2023-08-25 PROCEDURE — 99283 EMERGENCY DEPT VISIT LOW MDM: CPT

## 2023-08-25 PROCEDURE — 6370000000 HC RX 637 (ALT 250 FOR IP): Performed by: PHYSICIAN ASSISTANT

## 2023-08-25 PROCEDURE — 73030 X-RAY EXAM OF SHOULDER: CPT

## 2023-08-25 RX ORDER — LIDOCAINE 4 G/G
PATCH TOPICAL
Qty: 30 PATCH | Refills: 0 | Status: SHIPPED | OUTPATIENT
Start: 2023-08-25

## 2023-08-25 RX ORDER — NAPROXEN 500 MG/1
500 TABLET ORAL 2 TIMES DAILY PRN
Qty: 30 TABLET | Refills: 0 | Status: SHIPPED | OUTPATIENT
Start: 2023-08-25

## 2023-08-25 RX ORDER — NAPROXEN 250 MG/1
500 TABLET ORAL ONCE
Status: COMPLETED | OUTPATIENT
Start: 2023-08-25 | End: 2023-08-25

## 2023-08-25 RX ADMIN — NAPROXEN SODIUM 500 MG: 250 TABLET ORAL at 11:00

## 2023-08-25 ASSESSMENT — LIFESTYLE VARIABLES
HOW OFTEN DO YOU HAVE A DRINK CONTAINING ALCOHOL: NEVER
HOW MANY STANDARD DRINKS CONTAINING ALCOHOL DO YOU HAVE ON A TYPICAL DAY: PATIENT DOES NOT DRINK

## 2023-08-25 ASSESSMENT — PAIN SCALES - GENERAL
PAINLEVEL_OUTOF10: 7
PAINLEVEL_OUTOF10: 6

## 2023-08-25 NOTE — ED PROVIDER NOTES
[facility] [address] [address]        Pt Name: Ileana Regalado  MRN: [id number]  [address] [facility] [place] 1960  Date of evaluation: 8/25/2023  Provider: ALANA Simmons  PCP: Lexx Terry MD  Note Started: 10:51 AM EDT 8/25/23      NIKHIL. I have evaluated this patient. CHIEF COMPLAINT       Chief Complaint   Patient presents with    Shoulder Pain     Left shoulder pain x 1 week. Started only at night but this morning woke up with it. Moving furniture a week ago & could've injured it. Denies previous injury       HISTORY OF PRESENT ILLNESS: 1 or more Elements     History From: Patient    Ileana Regalado is a 57 y.o. male who presents for left shoulder pain that started 1 week ago after moving a washer down a steep staircase to the basement. Patient reports he was holding the handle of the genaro and moving it down the steps while another person was supposed to be supporting the base of the genaro but he does not think he was. Pain is worse with movement, worse at night, worse with laying on his left side. He is taking an over-the-counter medication for the pain. Is also on Norco chronically for back pain. He denies chest pain shortness of breath fever. Denies history of cardiac issues. Nursing Notes were reviewed and agreed with or any disagreements were addressed in the HPI. REVIEW OF SYSTEMS :      Review of Systems    Positives and Pertinent negatives as per HPI. SURGICAL HISTORY     Past Surgical History:   Procedure Laterality Date    COLONOSCOPY      CYST REMOVAL      upper back 2022    HERNIA REPAIR         CURRENTMEDICATIONS       Previous Medications    HYDROCODONE-ACETAMINOPHEN 7.5-300 MG TABS    Take 7.5 mg of opioid by mouth 3 times daily as needed for Pain. NEOMYCIN-BACITRACIN-POLYMYXIN (NEOSPORIN) 400-5-5000 OINTMENT    Apply topically 2 times daily       ALLERGIES     Patient has no known allergies.     FAMILYHISTORY

## 2023-08-25 NOTE — ED NOTES
Discharge and education instructions reviewed. Patient verbalized understanding, teach-back successful. Patient denied questions at this time. No acute distress noted. Patient instructed to follow-up as noted - return to emergency department if symptoms worsen. Patient verbalized understanding. Discharged per EDMD with discharge instructions.         Gavin Garcia RN  08/25/23 3082

## 2023-09-06 ENCOUNTER — OFFICE VISIT (OUTPATIENT)
Dept: ORTHOPEDIC SURGERY | Age: 63
End: 2023-09-06
Payer: MEDICARE

## 2023-09-06 VITALS — HEIGHT: 71 IN | WEIGHT: 154 LBS | BODY MASS INDEX: 21.56 KG/M2

## 2023-09-06 DIAGNOSIS — S46.012A ROTATOR CUFF STRAIN, LEFT, INITIAL ENCOUNTER: Primary | ICD-10-CM

## 2023-09-06 PROCEDURE — 3017F COLORECTAL CA SCREEN DOC REV: CPT | Performed by: ORTHOPAEDIC SURGERY

## 2023-09-06 PROCEDURE — 99203 OFFICE O/P NEW LOW 30 MIN: CPT | Performed by: ORTHOPAEDIC SURGERY

## 2023-09-06 PROCEDURE — 1036F TOBACCO NON-USER: CPT | Performed by: ORTHOPAEDIC SURGERY

## 2023-09-06 PROCEDURE — G8420 CALC BMI NORM PARAMETERS: HCPCS | Performed by: ORTHOPAEDIC SURGERY

## 2023-09-06 PROCEDURE — G8427 DOCREV CUR MEDS BY ELIG CLIN: HCPCS | Performed by: ORTHOPAEDIC SURGERY

## 2023-09-06 RX ORDER — NAPROXEN 500 MG/1
500 TABLET ORAL 2 TIMES DAILY WITH MEALS
Qty: 60 TABLET | Refills: 0 | Status: SHIPPED | OUTPATIENT
Start: 2023-09-06 | End: 2023-10-06

## 2023-09-06 NOTE — PROGRESS NOTES
CHIEF COMPLAINT: Left shoulder pain/ cuff tendinopathy/ impingement syndrome. HISTORY:  Mr. Alayna Garrido 57 y.o. male right handed presents today for the first visit for evaluation of left shoulder pain which started on 8/19/2023 when he was moving a washing machine. He is complaining of achy pain. He rates his pain a 5/10 VAS but can increase to a 10/10 VAS when he is trying to do something overhead or out in front. Pain is much worse with lifting. Pain is increase with elevation and decrease with rest. No radiation and no numbness and tingling sensation. No other complaint. No h/o gout. Denies prior left shoulder injury. He initially presented to [facility] ER where he was x-rayed and evaluated and referred to orthopedics. Treatment to date has consisted of over-the-counter Naprosyn with minimal improvement. Past Medical History:   Diagnosis Date    Back pain     Depression     History of alcohol dependence ([address])     Non-healing skin ulcers due to topical corticosteroid in therapeutic use ([address])     Rash        Past Surgical History:   Procedure Laterality Date    COLONOSCOPY      CYST REMOVAL      upper back 2022    HERNIA REPAIR         Social History     Socioeconomic History    Marital status: Single     Spouse name: Not on file    Number of children: Not on file    Years of education: Not on file    Highest education level: Not on file   Occupational History    Not on file   Tobacco Use    Smoking status: Former     Packs/day: 0.50     Years: 15.00     Pack years: 7.50     Types: Cigarettes    Smokeless tobacco: Never    Tobacco comments:     stopped smoking 01/2018   Substance and Sexual Activity    Alcohol use: No     Comment: drank 12 pack and 1/5 liquour daily for 15 years.   SOBER now 3/10/23    Drug use: No    Sexual activity: Yes     Partners: Female   Other Topics Concern    Not on file   Social History Narrative    Not on file     Social Determinants of Health     Financial Resource Strain: Not

## 2023-09-14 ENCOUNTER — HOSPITAL ENCOUNTER (OUTPATIENT)
Dept: PHYSICAL THERAPY | Age: 63
Setting detail: THERAPIES SERIES
Discharge: HOME | End: 2023-09-14
Attending: ORTHOPAEDIC SURGERY

## 2023-09-14 NOTE — FLOWSHEET NOTE
[address] [address]  Phone: [phone]   Fax:     [phone]    Physical Therapy  Cancellation/No-show Note  Patient Name:  Rose Marie Finch  :  1960   Date:  2023  Cancelled visits to date: 0  No-shows to date: 0    Patient status for today's appointment patient:  []  Cancelled  []  Rescheduled appointment  [x]  No-show     Reason given by patient:  []  Patient ill  []  Conflicting appointment  []  No transportation    []  Conflict with work  [x]  No reason given  []  Other:     Comments:      Phone call information:   []  Phone call made today to patient at _ time at number provided:      []  Patient answered, conversation as follows:    []  Patient did not answer, message left as follows:  [x]  Phone call not made today; patient no-show initial evaluation. Patient can call back to reschedule if desired.      Electronically signed by:  Simone Amaro PT, DPT

## 2023-10-03 ENCOUNTER — HOSPITAL ENCOUNTER (OUTPATIENT)
Dept: PHYSICAL THERAPY | Age: 63
Setting detail: THERAPIES SERIES
Discharge: HOME | End: 2023-10-03
Attending: ORTHOPAEDIC SURGERY

## 2023-10-03 NOTE — FLOWSHEET NOTE
[address] [address]  Phone: [phone]   Fax:     [phone]    Physical Therapy  Cancellation/No-show Note  Patient Name:  Lien Lange  :  1960   Date:  10/03/2023  Cancelled visits to date: 0  No-shows to date: 2    Patient status for today's appointment patient:  []  Cancelled  []  Rescheduled appointment  [x]  No-show     Reason given by patient:  []  Patient ill  []  Conflicting appointment  []  No transportation    []  Conflict with work  []  No reason given  []  Other:     Comments:      Phone call information:   []  Phone call made today to patient at _ time at number provided:      []  Patient answered, conversation as follows:    []  Patient did not answer, message left as follows:  []  Phone call not made today; 2nd no show for evaluation. Pt will be removed from schedule per policy. New PT is needed to attend PT. Electronically signed by:   Raina Felipe, PT, DPT

## 2024-02-20 NOTE — ED PROVIDER NOTES
3/3/2022 Patient returns for follow-up after hospitalization for SBP.  She also had melena.  A CT scan showed circumferential colonic wall thickening as well as several thickened loops of small bowel.  There was ascites present.  She underwent a paracentesis of almost 4000cc.  It is unclear from the discharge summary as to whether an organism was isolated.  She was treated with cefdinir.  She had had an endoscopy about 2 weeks prior to admission so this was not repeated.  She was treated with octreotide and PPI.  She did not have to have a blood transfusion. Currently, she feels fine.  She has had accumulation of ascites but not as bad as it has been in the past.  She has no ankle edema.  She has no further melena.  She denies abdominal pain, fever or chills.  She has no nausea or vomiting.  She denies confusion.  She is on lactulose once a day.  11/15/2022 Ms. Danya Leigh is a 66 year old female here for f/u of Cirrhosis. She had been followed by Dr Guzman. Since her last OV in March. She was evaluated by [facility] and had a TIPS on 3/30/2022. She then had a TIPS revision in August. She had worsening HE post revision and started on lactulose, zinc, and xifaxan. The zinc make her sick. Her diurectics have been managed by transplant and taking amiloride 10mg BID, lasix 40mg daily, and potassium daily. She again started having fluid accumulate and had multiple paracentesis. She had another TIPS revision 10/24/2022. Her ascites now feels controlled. Her HE seems well controlled at this time. She is having a lot of gas and loose stool. She will have a random sharp mid abdominal pain about once every 3 weeks. This only lasts a few seconds and then resovles. She has not seen any triggers for this. Her Hbg has been stable at 9. Her last EGD was 12/2021 with Grade II varices. CS  9/20/2023 EGD: esophagus normal, Two 6-7mm angioextasias in the gastric body nonbleeding. Coagulation for bleeding prevention with argon plasma used with larger lesions bleeding, gastric polyps.   10/9/2023 VCE: two small nonbleeding AVMs in the stomach  10/16/2023 Ms. Danya Leigh is here for f/u of Cirrhosis and recent admission for upper GI bleed. She has been followed by [facility] and had a TIPS on 3/30/2022 with multiple revisions. She has HE and on lactulose and xifaxan.  Her diurectics have been managed by transplant and taking amiloride 10mg BID, lasix 40mg daily, and potassium daily. She is having fluid accumulate and feels she needs a paracentesis. She has been in the hosptial multiple times for melena and JILLIAN. She had anEGD/Colon in June with no source. She had two EGDs in September with nonbleeding AVM that was ablated. She had a VCE last week. This is not back yet. CS  1/27/2024 EGD: three large angioectasias with bleedign in the greater curvature of the stomach.  Colonoscopy: red blood in the enitre colon
Smoker     Packs/day: 0.50     Years: 15.00     Pack years: 7.50     Types: Cigarettes    Smokeless tobacco: Never Used    Tobacco comment: stopped smoking 01/2018   Substance and Sexual Activity    Alcohol use: No     Comment: drank 12 pack and 1/5 liquour daily for 15 years. SOBER x 8 years as of 11/25/13    Drug use: No    Sexual activity: Yes     Partners: Female   Lifestyle    Physical activity:     Days per week: None     Minutes per session: None    Stress: None   Relationships    Social connections:     Talks on phone: None     Gets together: None     Attends [employer] service: None     Active member of club or organization: None     Attends meetings of clubs or organizations: None     Relationship status: None    Intimate partner violence:     Fear of current or ex partner: None     Emotionally abused: None     Physically abused: None     Forced sexual activity: None   Other Topics Concern    None   Social History Narrative    None       SCREENINGS             PHYSICAL EXAM    (up to 7 for level 4, 8 or more for level 5)     ED Triage Vitals [08/26/19 1119]   BP Temp Temp Source Pulse Resp SpO2 Height Weight   109/68 98.5 °F (36.9 °C) Oral 64 17 98 % 5' 11\" (1.803 m) 170 lb 10.2 oz (77.4 kg)       Physical Exam   Constitutional: Awake and alert. No apparent distress. Head: No visible evidence of trauma. Normocephalic. Eyes: Pupils equal and reactive. No photophobia. Conjunctiva normal.    HENT: Oral mucosa moist.  Airway patent. Pharynx without erythema. Nares were clear. Neck:  Soft and supple. Nontender. Heart:  Regular rate and rhythm. No murmur. Lungs:  Clear to auscultation. No wheezes, rales, or ronchi. No conversational dyspnea or accessory muscle use. Abdomen:  Soft, nondistended, bowel sounds present. Nontender. No guarding rigidity or rebound. No masses. Musculoskeletal: Extremities non-tender with full range of motion. Radial and dorsalis pedis pulses were intact.

## 2024-03-14 ENCOUNTER — HOSPITAL ENCOUNTER (EMERGENCY)
Age: 64
Discharge: HOME | End: 2024-03-14
Attending: EMERGENCY MEDICINE
Payer: MEDICARE

## 2024-03-14 VITALS
RESPIRATION RATE: 16 BRPM | BODY MASS INDEX: 21.7 KG/M2 | HEART RATE: 60 BPM | HEIGHT: 71 IN | SYSTOLIC BLOOD PRESSURE: 110 MMHG | OXYGEN SATURATION: 98 % | TEMPERATURE: 97.8 F | DIASTOLIC BLOOD PRESSURE: 59 MMHG | WEIGHT: 154.98 LBS

## 2024-03-14 DIAGNOSIS — R39.89 GENITAL SORE: Primary | ICD-10-CM

## 2024-03-14 PROCEDURE — 87253 VIRUS INOCULATE TISSUE ADDL: CPT

## 2024-03-14 PROCEDURE — 6370000000 HC RX 637 (ALT 250 FOR IP): Performed by: EMERGENCY MEDICINE

## 2024-03-14 PROCEDURE — 87252 VIRUS INOCULATION TISSUE: CPT

## 2024-03-14 PROCEDURE — 99283 EMERGENCY DEPT VISIT LOW MDM: CPT

## 2024-03-14 RX ORDER — ACYCLOVIR 200 MG/1
800 CAPSULE ORAL ONCE
Status: COMPLETED | OUTPATIENT
Start: 2024-03-14 | End: 2024-03-14

## 2024-03-14 RX ORDER — ACYCLOVIR 400 MG/1
400 TABLET ORAL 3 TIMES DAILY
Qty: 30 TABLET | Refills: 0 | Status: SHIPPED | OUTPATIENT
Start: 2024-03-14 | End: 2024-03-24

## 2024-03-14 RX ADMIN — ACYCLOVIR 800 MG: 200 CAPSULE ORAL at 06:04

## 2024-03-14 ASSESSMENT — PAIN - FUNCTIONAL ASSESSMENT: PAIN_FUNCTIONAL_ASSESSMENT: 0-10

## 2024-03-14 ASSESSMENT — PAIN SCALES - GENERAL: PAINLEVEL_OUTOF10: 0

## 2024-03-14 NOTE — ED TRIAGE NOTES
Patient to ED for rash to right groin x 3 days.  Patient is alert and oriented with GCS 15.  Patient reports trying topical hydrocortisone with no relief.  Patient denies any other complaints at this time.

## 2024-03-14 NOTE — DISCHARGE INSTRUCTIONS
Call today or tomorrow to follow up with Jeffry Kang MD  in 4-5 days.    Do not have any sexual intercourse until the test results (if obtained) are completed in 2 - 3 days.  Make sure you use condoms at all times.    Take your medication as indicated, if you are given an antibiotic then make sure you get the prescription filled and take the antibiotics until finished.  Drink plenty of water while taking the antibiotics.  Avoid drinking alcohol while taking antibiotics.     [employer], [facility] has some antibiotics for free; [facility] and [facility] has a 4 dollar prescription plan for some antibiotics.    Return to the Emergency Department for worsening of symptoms, pain with urination, discharge from your penis, any other care or concern.

## 2024-03-14 NOTE — ED PROVIDER NOTES
To Discharge 03/14/2024 06:07:19 AM       PATIENT REFERRED TO:   Jeffry Kang MD  [address] [address] [phone]    Call today  For a follow up appointment.     DISCHARGE MEDICATIONS:   New Prescriptions    ACYCLOVIR (ZOVIRAX) 400 MG TABLET    Take 1 tablet by mouth 3 times daily for 10 days If you have repeat flareup, take 400 mg 3 times daily for 5 days      DISCONTINUED MEDICATIONS:   Discontinued Medications    No medications on file            (Please note that portions of this note were completed with a voice recognition program.  Efforts were made to edit the dictations but occasionally words are mis-transcribed.)     Angel Nieto MD (electronically signed)         Angel Nieto MD  03/14/24 0699

## 2024-03-16 LAB
FINAL REPORT: NORMAL
PRELIMINARY: NORMAL

## 2024-03-20 ENCOUNTER — APPOINTMENT (OUTPATIENT)
Dept: CT IMAGING | Age: 64
End: 2024-03-20
Payer: MEDICARE

## 2024-03-20 ENCOUNTER — HOSPITAL ENCOUNTER (EMERGENCY)
Age: 64
Discharge: HOME | End: 2024-03-20
Payer: MEDICARE

## 2024-03-20 VITALS
SYSTOLIC BLOOD PRESSURE: 102 MMHG | TEMPERATURE: 97.4 F | WEIGHT: 155.42 LBS | OXYGEN SATURATION: 99 % | BODY MASS INDEX: 21.68 KG/M2 | DIASTOLIC BLOOD PRESSURE: 57 MMHG | HEART RATE: 78 BPM | RESPIRATION RATE: 18 BRPM

## 2024-03-20 DIAGNOSIS — S09.90XA CLOSED HEAD INJURY, INITIAL ENCOUNTER: Primary | ICD-10-CM

## 2024-03-20 DIAGNOSIS — S00.01XA ABRASION OF SCALP, INITIAL ENCOUNTER: ICD-10-CM

## 2024-03-20 PROCEDURE — 6370000000 HC RX 637 (ALT 250 FOR IP)

## 2024-03-20 PROCEDURE — 99284 EMERGENCY DEPT VISIT MOD MDM: CPT

## 2024-03-20 PROCEDURE — 70450 CT HEAD/BRAIN W/O DYE: CPT

## 2024-03-20 RX ORDER — ACETAMINOPHEN 500 MG
1000 TABLET ORAL EVERY 4 HOURS PRN
Status: DISCONTINUED | OUTPATIENT
Start: 2024-03-20 | End: 2024-03-21 | Stop reason: HOSPADM

## 2024-03-20 RX ORDER — ACETAMINOPHEN 500 MG
500 TABLET ORAL EVERY 6 HOURS PRN
Qty: 28 TABLET | Refills: 0 | Status: SHIPPED | OUTPATIENT
Start: 2024-03-20 | End: 2024-03-27

## 2024-03-20 RX ADMIN — BACITRACIN ZINC, NEOMYCIN SULFATE, AND POLYMYXIN B SULFATE: 400; 3.5; 5 OINTMENT TOPICAL at 21:56

## 2024-03-20 RX ADMIN — ACETAMINOPHEN 1000 MG: 500 TABLET ORAL at 21:56

## 2024-03-20 ASSESSMENT — PAIN DESCRIPTION - FREQUENCY: FREQUENCY: CONTINUOUS

## 2024-03-20 ASSESSMENT — PAIN DESCRIPTION - ORIENTATION: ORIENTATION: ANTERIOR

## 2024-03-20 ASSESSMENT — PAIN DESCRIPTION - LOCATION: LOCATION: HEAD

## 2024-03-20 ASSESSMENT — PAIN DESCRIPTION - PAIN TYPE: TYPE: ACUTE PAIN

## 2024-03-20 ASSESSMENT — PAIN DESCRIPTION - DESCRIPTORS: DESCRIPTORS: ACHING

## 2024-03-20 ASSESSMENT — PAIN SCALES - GENERAL: PAINLEVEL_OUTOF10: 4

## 2024-03-20 ASSESSMENT — PAIN DESCRIPTION - ONSET: ONSET: ON-GOING

## 2024-03-21 NOTE — ED PROVIDER NOTES
[facility] EMERGENCY DEPARTMENT  EMERGENCY DEPARTMENT ENCOUNTER        Pt Name: Alok Jaramillo  MRN: [id number]  Birthdate 1960  Date of evaluation: 3/20/2024  Provider: FRANCESCA Martinez - JENNY  PCP: Jeffry Kang MD  Note Started: 9:40 PM EDT 3/20/24      NIKHIL. I have evaluated this patient.        CHIEF COMPLAINT       Chief Complaint   Patient presents with    Fall     Pt reports falling off ladder yesterday after tripping on tarp. Pt states that he fell about \"3-4 feet\" and hit front of head on his \"BBQ grill\". Pt denies losing consciousness or being on blood thinners. Pt alert and oriented at this time with no signs of distress noted. Pt rates pain as a 4/10. VSS       HISTORY OF PRESENT ILLNESS: 1 or more Elements     History From: Patient        Social Determinants Significantly Affecting Health : Patient has significant healthcare illiteracy    Chief Complaint: Above    Alok Jaramilol is a 63 y.o. male who presents to ED with concern for fall off ladder.  Was at roughly his height (roughly 6 feet) when he fell off a ladder hitting his head on a grill.  No LOC.  No anticoagulation.  Has a scalp abrasion.  Came to ED today with worsening headache.  Nothing makes symptoms better.  No medicine prior arrival    The patient  reports that he has quit smoking. His smoking use included cigarettes. He has a 7.5 pack-year smoking history. He has never used smokeless tobacco. He reports that he does not drink alcohol and does not use drugs.       Nursing Notes were all reviewed and agreed with or any disagreements were addressed in the HPI.    REVIEW OF SYSTEMS :      Review of Systems    Positives and Pertinent negatives as per HPI.     SURGICAL HISTORY     Past Surgical History:   Procedure Laterality Date    COLONOSCOPY      CYST REMOVAL      upper back 2022    HERNIA REPAIR         CURRENTMEDICATIONS       Discharge Medication List as of 3/20/2024 11:13 PM        CONTINUE these

## 2024-03-21 NOTE — ED NOTES
Provider order placed for patient's discharge. Provider reviewed decision to discharge with the patient. Discharge paperwork and any prescriptions were reviewed with the patient. Patient verbalized understanding of discharge education and any prescriptions and has no further questions or further needs at this time. Patient left with all personal belongings and was stable upon departure. Patient thanked for choosing [facility] and informed to return should any need arise.

## 2024-08-16 ENCOUNTER — APPOINTMENT (OUTPATIENT)
Dept: GENERAL RADIOLOGY | Age: 64
End: 2024-08-16
Payer: MEDICARE

## 2024-08-16 ENCOUNTER — HOSPITAL ENCOUNTER (EMERGENCY)
Age: 64
Discharge: HOME | End: 2024-08-17
Payer: MEDICARE

## 2024-08-16 VITALS
BODY MASS INDEX: 21.67 KG/M2 | HEART RATE: 59 BPM | HEIGHT: 71 IN | RESPIRATION RATE: 16 BRPM | WEIGHT: 154.76 LBS | DIASTOLIC BLOOD PRESSURE: 56 MMHG | SYSTOLIC BLOOD PRESSURE: 95 MMHG | OXYGEN SATURATION: 96 % | TEMPERATURE: 98 F

## 2024-08-16 DIAGNOSIS — L03.114 CELLULITIS OF LEFT HAND: Primary | ICD-10-CM

## 2024-08-16 PROCEDURE — 99284 EMERGENCY DEPT VISIT MOD MDM: CPT

## 2024-08-16 PROCEDURE — 85025 COMPLETE CBC W/AUTO DIFF WBC: CPT

## 2024-08-16 PROCEDURE — 85652 RBC SED RATE AUTOMATED: CPT

## 2024-08-16 PROCEDURE — 36415 COLL VENOUS BLD VENIPUNCTURE: CPT

## 2024-08-16 PROCEDURE — 86140 C-REACTIVE PROTEIN: CPT

## 2024-08-16 PROCEDURE — 73140 X-RAY EXAM OF FINGER(S): CPT

## 2024-08-16 PROCEDURE — 80053 COMPREHEN METABOLIC PANEL: CPT

## 2024-08-16 ASSESSMENT — PAIN DESCRIPTION - ORIENTATION: ORIENTATION: LEFT

## 2024-08-16 ASSESSMENT — PAIN SCALES - GENERAL: PAINLEVEL_OUTOF10: 5

## 2024-08-16 ASSESSMENT — PAIN DESCRIPTION - LOCATION: LOCATION: HAND

## 2024-08-16 ASSESSMENT — PAIN - FUNCTIONAL ASSESSMENT: PAIN_FUNCTIONAL_ASSESSMENT: 0-10

## 2024-08-17 LAB
ALBUMIN SERPL-MCNC: 4.3 G/DL (ref 3.4–5)
ALBUMIN/GLOB SERPL: 1.7 {RATIO} (ref 1.1–2.2)
ALP SERPL-CCNC: 103 U/L (ref 40–129)
ALT SERPL-CCNC: 18 U/L (ref 10–40)
ANION GAP SERPL CALCULATED.3IONS-SCNC: 10 MMOL/L (ref 3–16)
AST SERPL-CCNC: 26 U/L (ref 15–37)
BASOPHILS # BLD: 0.1 K/UL (ref 0–0.2)
BASOPHILS NFR BLD: 0.6 %
BILIRUB SERPL-MCNC: 0.3 MG/DL (ref 0–1)
BUN SERPL-MCNC: 14 MG/DL (ref 7–20)
CALCIUM SERPL-MCNC: 9.2 MG/DL (ref 8.3–10.6)
CHLORIDE SERPL-SCNC: 105 MMOL/L (ref 99–110)
CO2 SERPL-SCNC: 26 MMOL/L (ref 21–32)
CREAT SERPL-MCNC: 1 MG/DL (ref 0.8–1.3)
CRP SERPL-MCNC: <3 MG/L (ref 0–5.1)
DEPRECATED RDW RBC AUTO: 14.5 % (ref 12.4–15.4)
EOSINOPHIL # BLD: 0.4 K/UL (ref 0–0.6)
EOSINOPHIL NFR BLD: 5.1 %
ERYTHROCYTE [SEDIMENTATION RATE] IN BLOOD BY WESTERGREN METHOD: 7 MM/HR (ref 0–20)
GFR SERPLBLD CREATININE-BSD FMLA CKD-EPI: 83 ML/MIN/{1.73_M2}
GLUCOSE SERPL-MCNC: 99 MG/DL (ref 70–99)
HCT VFR BLD AUTO: 42.8 % (ref 40.5–52.5)
HGB BLD-MCNC: 14.4 G/DL (ref 13.5–17.5)
LYMPHOCYTES # BLD: 2.6 K/UL (ref 1–5.1)
LYMPHOCYTES NFR BLD: 32.8 %
MCH RBC QN AUTO: 32.1 PG (ref 26–34)
MCHC RBC AUTO-ENTMCNC: 33.7 G/DL (ref 31–36)
MCV RBC AUTO: 95.1 FL (ref 80–100)
MONOCYTES # BLD: 0.8 K/UL (ref 0–1.3)
MONOCYTES NFR BLD: 10.3 %
NEUTROPHILS # BLD: 4.1 K/UL (ref 1.7–7.7)
NEUTROPHILS NFR BLD: 51.2 %
PLATELET # BLD AUTO: 175 K/UL (ref 135–450)
PMV BLD AUTO: 9.2 FL (ref 5–10.5)
POTASSIUM SERPL-SCNC: 4 MMOL/L (ref 3.5–5.1)
PROT SERPL-MCNC: 6.8 G/DL (ref 6.4–8.2)
RBC # BLD AUTO: 4.5 M/UL (ref 4.2–5.9)
SODIUM SERPL-SCNC: 141 MMOL/L (ref 136–145)
WBC # BLD AUTO: 8 K/UL (ref 4–11)

## 2024-08-17 PROCEDURE — 6370000000 HC RX 637 (ALT 250 FOR IP)

## 2024-08-17 RX ORDER — SULFAMETHOXAZOLE AND TRIMETHOPRIM 800; 160 MG/1; MG/1
1 TABLET ORAL 2 TIMES DAILY
Qty: 14 TABLET | Refills: 0 | Status: SHIPPED | OUTPATIENT
Start: 2024-08-17 | End: 2024-08-24

## 2024-08-17 RX ORDER — SULFAMETHOXAZOLE AND TRIMETHOPRIM 800; 160 MG/1; MG/1
1 TABLET ORAL ONCE
Status: COMPLETED | OUTPATIENT
Start: 2024-08-17 | End: 2024-08-17

## 2024-08-17 RX ORDER — CEPHALEXIN 500 MG/1
500 CAPSULE ORAL ONCE
Status: COMPLETED | OUTPATIENT
Start: 2024-08-17 | End: 2024-08-17

## 2024-08-17 RX ORDER — CEPHALEXIN 500 MG/1
500 CAPSULE ORAL 4 TIMES DAILY
Qty: 28 CAPSULE | Refills: 0 | Status: SHIPPED | OUTPATIENT
Start: 2024-08-17 | End: 2024-08-24

## 2024-08-17 RX ADMIN — SULFAMETHOXAZOLE AND TRIMETHOPRIM 1 TABLET: 800; 160 TABLET ORAL at 01:35

## 2024-08-17 RX ADMIN — CEPHALEXIN 500 MG: 500 CAPSULE ORAL at 01:35

## 2024-08-17 RX ADMIN — BACITRACIN ZINC, NEOMYCIN SULFATE, AND POLYMYXIN B SULFATE: 400; 3.5; 5 OINTMENT TOPICAL at 01:42

## 2024-08-17 NOTE — DISCHARGE INSTRUCTIONS
Please take the antibiotic(s), as prescribed. Do not stop taking the medication early, even if you feel entirely well.    Follow-up with wound care as discussed

## 2024-08-17 NOTE — ED PROVIDER NOTES
[facility] EMERGENCY DEPARTMENT  EMERGENCY DEPARTMENT ENCOUNTER        Pt Name: Alok Jaramillo  MRN: [id number]  Birthdate 1960  Date of evaluation: 8/16/2024  Provider: FRANCESCA Martinez - JENNY  PCP: Jeffry Kang MD  Note Started: 11:08 PM EDT 8/16/24      NIKHIL. I have evaluated this patient.        CHIEF COMPLAINT       Chief Complaint   Patient presents with    Laceration     Pt. Working out in yard 1 week ago. Pt got laceration from yard equipment on L thumb. No c/o pain. Redness and swollen. Pt. Went to urgent care a/f accident and was d/c w/ antibiotics.        HISTORY OF PRESENT ILLNESS: 1 or more Elements     History From: Pt    Limitations to history : None    Social Determinants Significantly Affecting Health : None    Chief Complaint: Above    Alok Jaramillo is a 63 y.o. male who  has a past medical history of Back pain, Depression, Diabetes mellitus (HCC), History of alcohol dependence (HCC), Non-healing skin ulcers due to topical corticosteroid in therapeutic use (HCC), and Rash. presents to ED with concern for blister to his left hand  Metal rake left hand  RHD  Urg care gave abx. Now no longer getting better.     The patient  reports that he has quit smoking. His smoking use included cigarettes. He has a 7.5 pack-year smoking history. He has never used smokeless tobacco. He reports that he does not drink alcohol and does not use drugs.     Nursing Notes were all reviewed and agreed with or any disagreements were addressed in the HPI.    REVIEW OF SYSTEMS :      Review of Systems    Positives and Pertinent negatives as per HPI.     SURGICAL HISTORY     Past Surgical History:   Procedure Laterality Date    COLONOSCOPY      CYST REMOVAL      upper back 2022    HERNIA REPAIR         CURRENTMEDICATIONS       Discharge Medication List as of 8/17/2024  1:45 AM        CONTINUE these medications which have NOT CHANGED    Details   acetaminophen (TYLENOL) 500 MG tablet Take 1

## 2024-08-17 NOTE — ED TRIAGE NOTES
Pt. Working out in yard 1 week ago. Pt got laceration from yard equipment on L thumb. No c/o pain. Redness and swollen. Pt. Went to urgent care a/f accident and was d/c w/ antibiotics.

## 2024-08-20 ENCOUNTER — HOSPITAL ENCOUNTER (OUTPATIENT)
Dept: WOUND CARE | Age: 64
Discharge: HOME | End: 2024-08-20
Attending: EMERGENCY MEDICINE
Payer: MEDICARE

## 2024-08-20 VITALS
TEMPERATURE: 97.4 F | DIASTOLIC BLOOD PRESSURE: 62 MMHG | HEART RATE: 61 BPM | RESPIRATION RATE: 16 BRPM | HEIGHT: 71 IN | WEIGHT: 151.24 LBS | BODY MASS INDEX: 21.17 KG/M2 | SYSTOLIC BLOOD PRESSURE: 107 MMHG

## 2024-08-20 DIAGNOSIS — L98.499 DIABETES MELLITUS WITH SKIN ULCER (HCC): ICD-10-CM

## 2024-08-20 DIAGNOSIS — Z74.09 IMPAIRED MOBILITY: ICD-10-CM

## 2024-08-20 DIAGNOSIS — S61.412A LACERATION OF LEFT HAND WITHOUT FOREIGN BODY, INITIAL ENCOUNTER: Primary | ICD-10-CM

## 2024-08-20 DIAGNOSIS — E11.622 DIABETES MELLITUS WITH SKIN ULCER (HCC): ICD-10-CM

## 2024-08-20 DIAGNOSIS — R60.0 LOCALIZED EDEMA: ICD-10-CM

## 2024-08-20 PROCEDURE — 99213 OFFICE O/P EST LOW 20 MIN: CPT

## 2024-08-20 PROCEDURE — 11042 DBRDMT SUBQ TIS 1ST 20SQCM/<: CPT | Performed by: EMERGENCY MEDICINE

## 2024-08-20 PROCEDURE — 11042 DBRDMT SUBQ TIS 1ST 20SQCM/<: CPT

## 2024-08-20 PROCEDURE — 99203 OFFICE O/P NEW LOW 30 MIN: CPT | Performed by: EMERGENCY MEDICINE

## 2024-08-20 RX ORDER — GINSENG 100 MG
CAPSULE ORAL ONCE
OUTPATIENT
Start: 2024-08-20 | End: 2024-08-20

## 2024-08-20 RX ORDER — LIDOCAINE HYDROCHLORIDE 40 MG/ML
SOLUTION TOPICAL ONCE
OUTPATIENT
Start: 2024-08-20 | End: 2024-08-20

## 2024-08-20 RX ORDER — LIDOCAINE 50 MG/G
OINTMENT TOPICAL ONCE
OUTPATIENT
Start: 2024-08-20 | End: 2024-08-20

## 2024-08-20 RX ORDER — GENTAMICIN SULFATE 1 MG/G
OINTMENT TOPICAL ONCE
OUTPATIENT
Start: 2024-08-20 | End: 2024-08-20

## 2024-08-20 RX ORDER — IBUPROFEN 200 MG
TABLET ORAL ONCE
OUTPATIENT
Start: 2024-08-20 | End: 2024-08-20

## 2024-08-20 RX ORDER — ASPIRIN 81 MG/1
81 TABLET, CHEWABLE ORAL DAILY
COMMUNITY
Start: 2022-12-05

## 2024-08-20 RX ORDER — BETAMETHASONE DIPROPIONATE 0.5 MG/G
CREAM TOPICAL ONCE
OUTPATIENT
Start: 2024-08-20 | End: 2024-08-20

## 2024-08-20 RX ORDER — SODIUM CHLOR/HYPOCHLOROUS ACID 0.033 %
SOLUTION, IRRIGATION IRRIGATION ONCE
OUTPATIENT
Start: 2024-08-20 | End: 2024-08-20

## 2024-08-20 RX ORDER — BACITRACIN ZINC AND POLYMYXIN B SULFATE 500; 1000 [USP'U]/G; [USP'U]/G
OINTMENT TOPICAL ONCE
OUTPATIENT
Start: 2024-08-20 | End: 2024-08-20

## 2024-08-20 RX ORDER — ROSUVASTATIN CALCIUM 5 MG/1
5 TABLET, COATED ORAL DAILY
COMMUNITY

## 2024-08-20 RX ORDER — LIDOCAINE 40 MG/G
CREAM TOPICAL ONCE
Status: COMPLETED | OUTPATIENT
Start: 2024-08-20 | End: 2024-08-20

## 2024-08-20 RX ORDER — CLOBETASOL PROPIONATE 0.5 MG/G
OINTMENT TOPICAL ONCE
OUTPATIENT
Start: 2024-08-20 | End: 2024-08-20

## 2024-08-20 RX ORDER — TRIAMCINOLONE ACETONIDE 1 MG/G
OINTMENT TOPICAL ONCE
OUTPATIENT
Start: 2024-08-20 | End: 2024-08-20

## 2024-08-20 RX ORDER — LIDOCAINE 40 MG/G
CREAM TOPICAL ONCE
OUTPATIENT
Start: 2024-08-20 | End: 2024-08-20

## 2024-08-20 RX ORDER — LIDOCAINE HYDROCHLORIDE 20 MG/ML
JELLY TOPICAL ONCE
OUTPATIENT
Start: 2024-08-20 | End: 2024-08-20

## 2024-08-20 RX ORDER — METFORMIN HYDROCHLORIDE 500 MG/1
500 TABLET, EXTENDED RELEASE ORAL
COMMUNITY
Start: 2023-11-10

## 2024-08-20 RX ADMIN — LIDOCAINE: 40 CREAM TOPICAL at 09:34

## 2024-08-20 ASSESSMENT — PAIN SCALES - GENERAL: PAINLEVEL_OUTOF10: 0

## 2024-08-20 NOTE — H&P
tablet by mouth 2 times daily as needed for Pain 30 tablet 0    HYDROcodone-acetaminophen 7.5-300 MG TABS Take 1 tablet by mouth 3 times daily as needed for Pain.      neomycin-bacitracin-polymyxin (NEOSPORIN) 400-5-5000 ointment Apply topically 2 times daily 15 g 0    acetaminophen (TYLENOL) 500 MG tablet Take 1 tablet by mouth every 6 hours as needed for Pain 28 tablet 0     No current facility-administered medications on file prior to encounter.       REVIEW OF SYSTEMS  A comprehensive review of systems was negative except noted in HPI/wound narrative and/or updates above.  Written patient dismissal instructions given to patient and signed by patient or POA.  Patient voiced understanding that the importance of adherence to instructions is paramount to wound healing improvement or success.   Patient Instructions   [facility] Wound Care Physician Orders and Discharge Instructions  [place] [id number]  [place] [address]  Telephone: [phone]      FAX [phone]  MONDAY - THURSDAY 8:00 am - 4:30 pm and Friday 8:00 am - 12:00 pm.        NAME:  Alok Jaramillo  YOB: 1960  MEDICAL RECORD NUMBER:  [id number]  DATE:  8/20/2024      Return Appointment:  [x] Return Appointment: With Dr Mandy Blanco  in  1 Week(s)  [] Wound and dressing supply provider:   [] ECF or Home Healthcare:  [] Wound Assessment: [] Physician or NP scheduled for Wound Assessment:   [] Orders placed during your visit:      Important Reminders:   Please wash hands with soap and water before and after every dressing change.  Do not scrub wounds.  Keep wounds dry in shower unless otherwise instructed by the physician.  SMOKING can slow would healing. Stop smoking as soon as possible to improve healing and prevent further complications associated with smoking.      My-Wound Topical Treatments:  Do not apply lotions, creams, or ointments to wound bed unless directed.   [] Apply

## 2024-08-20 NOTE — PATIENT INSTRUCTIONS
[facility] Wound Care Physician Orders and Discharge Instructions  [facility]  [address], [address]  [place], [place] [address]  Telephone: [phone]      FAX [phone]  MONDAY - THURSDAY 8:00 am - 4:30 pm and Friday 8:00 am - 12:00 pm.        NAME:  Alok Jaramillo  YOB: 1960  MEDICAL RECORD NUMBER:  [id number]  DATE:  8/20/2024      Return Appointment:  [x] Return Appointment: With Dr Mandy Blanco  in  1 Week(s)  [] Wound and dressing supply provider:   [] ECF or Home Healthcare:  [] Wound Assessment: [] Physician or NP scheduled for Wound Assessment:   [] Orders placed during your visit:      Important Reminders:   Please wash hands with soap and water before and after every dressing change.  Do not scrub wounds.  Keep wounds dry in shower unless otherwise instructed by the physician.  SMOKING can slow would healing. Stop smoking as soon as possible to improve healing and prevent further complications associated with smoking.      My-Wound Topical Treatments:  Do not apply lotions, creams, or ointments to wound bed unless directed.   [] Apply moisturizing lotion to skin surrounding the wound prior to dressing change.  [] Apply antifungal ointment to skin surrounding the wound prior to dressing change.  [] Apply thin film of no sting moisture barrier ointment to skin immediately around      wound.  [] Other:     **COMPLETE ANTIBIOTCS GIVEN BY ER**      Wound Location: LEFT THUMB    Wound Cleansing:     Primary Dressing:  [x] BETADINE AROUND WOUND AND LET DRY  [x] MEPITEL OVER WOUND -  PEEL BACK TO APPLY BETADINE AND REPLACE    Secondary Dressing:  [x] GAUZE  [x] ROLL GAUZE  ( NO COBAN ) / SPANDAGE      Dressing Frequency:  [x] THREE TIMES PER WEEK  [] Do Not Change Dressing                                  [] Assistive Devices     Use as instructed by the provider      Activity: Activity as Tolerated      Dietary:   Continue your diet as tolerated.  Protein is

## 2024-08-27 ENCOUNTER — HOSPITAL ENCOUNTER (OUTPATIENT)
Dept: WOUND CARE | Age: 64
Discharge: HOME | End: 2024-08-27
Attending: EMERGENCY MEDICINE
Payer: MEDICARE

## 2024-08-27 VITALS
SYSTOLIC BLOOD PRESSURE: 117 MMHG | TEMPERATURE: 97.9 F | DIASTOLIC BLOOD PRESSURE: 71 MMHG | RESPIRATION RATE: 16 BRPM | HEART RATE: 69 BPM

## 2024-08-27 DIAGNOSIS — R60.0 LOCALIZED EDEMA: ICD-10-CM

## 2024-08-27 DIAGNOSIS — E11.622 DIABETES MELLITUS WITH SKIN ULCER (HCC): ICD-10-CM

## 2024-08-27 DIAGNOSIS — S61.412D LACERATION OF LEFT HAND WITHOUT FOREIGN BODY, SUBSEQUENT ENCOUNTER: Primary | ICD-10-CM

## 2024-08-27 DIAGNOSIS — Z74.09 IMPAIRED MOBILITY: ICD-10-CM

## 2024-08-27 DIAGNOSIS — L98.499 DIABETES MELLITUS WITH SKIN ULCER (HCC): ICD-10-CM

## 2024-08-27 PROCEDURE — 99213 OFFICE O/P EST LOW 20 MIN: CPT | Performed by: EMERGENCY MEDICINE

## 2024-08-27 PROCEDURE — 99212 OFFICE O/P EST SF 10 MIN: CPT

## 2024-08-27 ASSESSMENT — PAIN SCALES - GENERAL
PAINLEVEL_OUTOF10: 0
PAINLEVEL_OUTOF10: 0

## 2024-08-27 NOTE — DISCHARGE INSTRUCTIONS
DISCHARGE INSTRUCTIONS  Wound Clinic Physician Orders   [facility]  [address]  [place], [place] [address]  Telephone: [phone]      FAX [phone]    NAME:  Alok Jaramillo  YOB: 1960  MEDICAL RECORD NUMBER:  [id number]  DATE:  8/27/2024    Congratulations!  You have completed your treatment.       Return to your Primary Care Physician for all your health issues.   Resume your ordinary activities as tolerated.   Take your medications as prescribed by your primary care physician.   Check your skin daily for cracks, bruises, sores, or dryness. Use a moisturizer as needed.   Clean and dry your skin, using mild soap and warm water (not hot).   Avoid alcohol and caffeine and do not smoke.  Maintain a nutritious diet.  Avoid pressure on your wound site. Keep your legs elevated above the level of the heart whenever possible.  Other: FINISH ANTIBIOTICS AS PRESCRIBED- NO FURTHER ANTIBIOTICS NEEDED AT THIS TIME.    **MAY USE A BAND-AID TO PROTECT HEALED AREA DURING THE DAY AND OPEN TO AIR AT NIGHT- MAY USE FOR ONE WEEK**      Physician Signature:______________________    Date: ___________ Time:  ____________    Dr Mandy Blanco            Electronically signed by Tiara Lobo RN on 8/27/2024 at 8:20 AM

## 2024-08-27 NOTE — PROGRESS NOTES
[facility]     Note Type: Medical Staff Progress Note    Referring Provider: FRANCESCA Martinez - *  Reason for Referral: left thumb wound    Alok Jaramillo  MEDICAL RECORD NUMBER:  [id number]  AGE: 63 y.o.   GENDER: male  : 1960  EPISODE DATE:  2024    Chief complaint and reason for visit:     Chief Complaint   Patient presents with    Wound Check     Follow up for left thumb.        HPI/Wound Narrative:      Alok Jaramillo is a 63 y.o. male who presents today for an evaluation of a wound/ulcer. Wound duration:  around 8/10/24. .    24: doing well. No new wound care issues.    24: 62 yo male diagnosed with diabetes mellitus 8 months ago, who was doing some yard work and sustained an injury/deep abrasion to his left thumb.  Patient initially went to urgent care and was placed on antibiotics.  Patient then presented to the emergency department on 2024 due to worsening redness and swelling.  He had labs obtained and x-rays done.  Patient diagnosed with left hand cellulitis and was placed on Keflex and Bactrim.  He was then referred to our wound care center for further recommendations. Pertinent associated symptoms: drainage , redness, swelling, and impaired mobility. Denies any systemic complaints.    Medical Decision Making:     Historian(s): patient .      63-year-old male with diabetes mellitus type 2, who sustained an injury to his left thumb now with remaining wound.    Comorbid conditions affecting wound healing: As noted in PMH and PSH which was reviewed.  Pertinent labs reviewed.   Review of medical records and external note (s) from other providers was done for this visit.    Problem List Items Addressed This Visit          Endocrine    Diabetes mellitus with skin ulcer (HCC)       Other    Laceration of left hand - Primary    Impaired mobility    Localized edema     Wounds/Problems Addressed and Treatment Plan:  Open wound left thumb/hand.

## 2025-08-23 PROCEDURE — 99283 EMERGENCY DEPT VISIT LOW MDM: CPT

## 2025-08-24 ENCOUNTER — HOSPITAL ENCOUNTER (EMERGENCY)
Age: 65
Discharge: HOME | End: 2025-08-24
Payer: MEDICARE

## 2025-08-24 VITALS
RESPIRATION RATE: 18 BRPM | BODY MASS INDEX: 20.92 KG/M2 | DIASTOLIC BLOOD PRESSURE: 56 MMHG | TEMPERATURE: 97.9 F | HEART RATE: 61 BPM | WEIGHT: 149.9 LBS | OXYGEN SATURATION: 96 % | SYSTOLIC BLOOD PRESSURE: 118 MMHG

## 2025-08-24 DIAGNOSIS — L01.00 IMPETIGO: Primary | ICD-10-CM

## 2025-08-24 RX ORDER — MUPIROCIN 2 %
OINTMENT (GRAM) TOPICAL
Qty: 15 G | Refills: 0 | Status: SHIPPED | OUTPATIENT
Start: 2025-08-24 | End: 2025-08-24

## 2025-08-24 RX ORDER — MUPIROCIN 2 %
OINTMENT (GRAM) TOPICAL
Qty: 15 G | Refills: 0 | Status: SHIPPED | OUTPATIENT
Start: 2025-08-24 | End: 2025-08-31

## 2025-08-24 ASSESSMENT — PAIN - FUNCTIONAL ASSESSMENT: PAIN_FUNCTIONAL_ASSESSMENT: 0-10

## 2025-08-24 ASSESSMENT — PAIN DESCRIPTION - DESCRIPTORS: DESCRIPTORS: BURNING

## 2025-08-24 ASSESSMENT — PAIN DESCRIPTION - PAIN TYPE: TYPE: ACUTE PAIN

## 2025-08-24 ASSESSMENT — PAIN DESCRIPTION - LOCATION: LOCATION: NOSE

## 2025-08-24 ASSESSMENT — PAIN SCALES - GENERAL: PAINLEVEL_OUTOF10: 4
